# Patient Record
Sex: FEMALE | Race: WHITE
[De-identification: names, ages, dates, MRNs, and addresses within clinical notes are randomized per-mention and may not be internally consistent; named-entity substitution may affect disease eponyms.]

---

## 2017-02-22 ENCOUNTER — PRIOR ORIGINAL RECORDS (OUTPATIENT)
Dept: OTHER | Age: 82
End: 2017-02-22

## 2017-02-22 ENCOUNTER — MYAURORA ACCOUNT LINK (OUTPATIENT)
Dept: OTHER | Age: 82
End: 2017-02-22

## 2017-03-06 ENCOUNTER — PRIOR ORIGINAL RECORDS (OUTPATIENT)
Dept: OTHER | Age: 82
End: 2017-03-06

## 2017-03-23 ENCOUNTER — PRIOR ORIGINAL RECORDS (OUTPATIENT)
Dept: OTHER | Age: 82
End: 2017-03-23

## 2017-03-27 ENCOUNTER — PRIOR ORIGINAL RECORDS (OUTPATIENT)
Dept: OTHER | Age: 82
End: 2017-03-27

## 2017-03-27 ENCOUNTER — MYAURORA ACCOUNT LINK (OUTPATIENT)
Dept: OTHER | Age: 82
End: 2017-03-27

## 2017-05-26 ENCOUNTER — PRIOR ORIGINAL RECORDS (OUTPATIENT)
Dept: OTHER | Age: 82
End: 2017-05-26

## 2017-05-26 LAB
ALT (SGPT): 24 U/L
AST (SGOT): 21 U/L
CHOLESTEROL, TOTAL: 167 MG/DL
HDL CHOLESTEROL: 43 MG/DL
LDL CHOLESTEROL: 98 MG/DL
NON-HDL CHOLESTEROL: 124 MG/DL
TOTAL CHOLESTEROL / HDL RATIO: 3.9 RATIO UN
TRIGLYCERIDES: 129 MG/DL

## 2017-08-04 ENCOUNTER — PRIOR ORIGINAL RECORDS (OUTPATIENT)
Dept: OTHER | Age: 82
End: 2017-08-04

## 2018-01-21 ENCOUNTER — PRIOR ORIGINAL RECORDS (OUTPATIENT)
Dept: OTHER | Age: 83
End: 2018-01-21

## 2018-01-22 LAB
ALBUMIN: 4.2 G/DL
ALKALINE PHOSPHATATE(ALK PHOS): 75 IU/L
ALT (SGPT): 24 U/L
AST (SGOT): 18 U/L
BILIRUBIN TOTAL: 0.4 MG/DL
CHOLESTEROL, TOTAL: 169 MG/DL
HDL CHOLESTEROL: 52 MG/DL
LDL CHOLESTEROL: 95 MG/DL
NON-HDL CHOLESTEROL: 117 MG/DL
PROTEIN, TOTAL: 7.4 G/DL
TRIGLYCERIDES: 108 MG/DL

## 2018-01-30 ENCOUNTER — MYAURORA ACCOUNT LINK (OUTPATIENT)
Dept: OTHER | Age: 83
End: 2018-01-30

## 2018-01-30 ENCOUNTER — PRIOR ORIGINAL RECORDS (OUTPATIENT)
Dept: OTHER | Age: 83
End: 2018-01-30

## 2018-02-20 ENCOUNTER — PRIOR ORIGINAL RECORDS (OUTPATIENT)
Dept: OTHER | Age: 83
End: 2018-02-20

## 2018-02-20 LAB
INR: 1.8
PROTHROMBIN TIME: 21.3 SECONDS

## 2018-03-27 ENCOUNTER — PRIOR ORIGINAL RECORDS (OUTPATIENT)
Dept: OTHER | Age: 83
End: 2018-03-27

## 2018-05-30 ENCOUNTER — PRIOR ORIGINAL RECORDS (OUTPATIENT)
Dept: OTHER | Age: 83
End: 2018-05-30

## 2018-08-14 ENCOUNTER — PRIOR ORIGINAL RECORDS (OUTPATIENT)
Dept: OTHER | Age: 83
End: 2018-08-14

## 2018-08-22 LAB
HEMATOCRIT: 35.3 %
HEMOGLOBIN: 11.7 G/DL
PLATELETS: 357 K/UL
POLYS (ABSOLUTE): NORMAL X 10-3/U
RED BLOOD COUNT: 3.1 X 10-6/U
WHITE BLOOD COUNT: 7.3 X 10-3/U

## 2019-01-01 ENCOUNTER — EXTERNAL RECORD (OUTPATIENT)
Dept: HEALTH INFORMATION MANAGEMENT | Facility: OTHER | Age: 84
End: 2019-01-01

## 2019-02-28 VITALS
RESPIRATION RATE: 16 BRPM | SYSTOLIC BLOOD PRESSURE: 154 MMHG | HEIGHT: 63 IN | DIASTOLIC BLOOD PRESSURE: 92 MMHG | WEIGHT: 145.1 LBS | BODY MASS INDEX: 25.71 KG/M2 | HEART RATE: 70 BPM

## 2019-03-01 VITALS — HEART RATE: 70 BPM | DIASTOLIC BLOOD PRESSURE: 70 MMHG | SYSTOLIC BLOOD PRESSURE: 140 MMHG | WEIGHT: 149 LBS

## 2019-03-01 VITALS — HEIGHT: 63 IN | SYSTOLIC BLOOD PRESSURE: 128 MMHG | DIASTOLIC BLOOD PRESSURE: 60 MMHG

## 2019-04-18 RX ORDER — WARFARIN SODIUM 5 MG/1
TABLET ORAL
Qty: 45 TABLET | Refills: 0 | Status: SHIPPED | OUTPATIENT
Start: 2019-04-18

## 2019-04-19 ENCOUNTER — TELEPHONE (OUTPATIENT)
Dept: CARDIOLOGY | Age: 84
End: 2019-04-19

## 2019-07-26 LAB
ABSOLUTE IMMATURE GRANULOCYTES (OFFPRE24): NORMAL
ALBUMIN SERPL-MCNC: 4.5 G/DL
ALBUMIN/GLOB SERPL: NORMAL {RATIO}
ALP SERPL-CCNC: 93 U/L
ALT SERPL-CCNC: 19 U/L
ANION GAP SERPL CALC-SCNC: NORMAL MMOL/L
AST SERPL-CCNC: 17 U/L
BASO+EOS+MONOS # BLD: NORMAL 10*3/UL
BASO+EOS+MONOS NFR BLD: NORMAL %
BASOPHILS # BLD: NORMAL 10*3/UL
BASOPHILS NFR BLD: NORMAL %
BILIRUB SERPL-MCNC: 0.3 MG/DL
BUN SERPL-MCNC: NORMAL MG/DL
BUN/CREAT SERPL: NORMAL
CALCIUM SERPL-MCNC: 9.1 MG/DL
CHLORIDE SERPL-SCNC: 103 MMOL/L
CO2 SERPL-SCNC: NORMAL MMOL/L
CREAT SERPL-MCNC: NORMAL MG/DL
DIFFERENTIAL METHOD BLD: NORMAL
EOSINOPHIL # BLD: NORMAL 10*3/UL
EOSINOPHIL NFR BLD: NORMAL %
ERYTHROCYTE [DISTWIDTH] IN BLOOD: NORMAL %
GLOBULIN SER-MCNC: NORMAL G/DL
GLUCOSE SERPL-MCNC: 133 MG/DL
HCT VFR BLD CALC: 38.3 %
HGB BLD-MCNC: 12.6 G/DL
IMMATURE GRANULOCYTES (OFFPRE25): NORMAL
LENGTH OF FAST TIME PATIENT: NORMAL H
LYMPHOCYTES # BLD: NORMAL 10*3/UL
LYMPHOCYTES NFR BLD: NORMAL %
MCH RBC QN AUTO: 38 PG
MCHC RBC AUTO-ENTMCNC: 33 G/DL
MCV RBC AUTO: 115 FL
MONOCYTES # BLD: NORMAL 10*3/UL
MONOCYTES NFR BLD: NORMAL %
MPV (OFFPRE2): NORMAL
NEUTROPHILS # BLD: NORMAL 10*3/UL
NEUTROPHILS NFR BLD: NORMAL %
NRBC BLD MANUAL-RTO: NORMAL %
PLAT MORPH BLD: NORMAL
PLATELET # BLD: 361 10*3/UL
POTASSIUM SERPL-SCNC: 4.8 MMOL/L
PROT SERPL-MCNC: 7.3 G/DL
RBC # BLD: 3.3 10*6/UL
RBC MORPH BLD: NORMAL
SODIUM SERPL-SCNC: 141 MMOL/L
WBC # BLD: 8.6 10*3/UL
WBC MORPH BLD: NORMAL

## 2019-08-16 ENCOUNTER — LAB SERVICES (OUTPATIENT)
Dept: CARDIOLOGY | Age: 84
End: 2019-08-16

## 2019-09-11 ENCOUNTER — DOCUMENTATION (OUTPATIENT)
Dept: CARDIOLOGY | Age: 84
End: 2019-09-11

## 2019-09-24 ENCOUNTER — TELEPHONE (OUTPATIENT)
Dept: CARDIOLOGY | Age: 84
End: 2019-09-24

## 2019-09-25 RX ORDER — LISINOPRIL 20 MG/1
20 TABLET ORAL 2 TIMES DAILY
Qty: 60 TABLET | Refills: 0 | Status: SHIPPED | OUTPATIENT
Start: 2019-09-25

## 2020-01-01 ENCOUNTER — EXTERNAL RECORD (OUTPATIENT)
Dept: HEALTH INFORMATION MANAGEMENT | Facility: OTHER | Age: 85
End: 2020-01-01

## 2021-01-01 ENCOUNTER — EXTERNAL RECORD (OUTPATIENT)
Dept: HEALTH INFORMATION MANAGEMENT | Facility: OTHER | Age: 86
End: 2021-01-01

## 2023-06-02 ENCOUNTER — LAB REQUISITION (OUTPATIENT)
Dept: LAB | Facility: HOSPITAL | Age: 88
End: 2023-06-02
Payer: MEDICARE

## 2023-06-02 DIAGNOSIS — Z01.89 ENCOUNTER FOR OTHER SPECIFIED SPECIAL EXAMINATIONS: ICD-10-CM

## 2023-06-02 PROCEDURE — 87070 CULTURE OTHR SPECIMN AEROBIC: CPT | Performed by: OPHTHALMOLOGY

## 2023-06-02 PROCEDURE — 87205 SMEAR GRAM STAIN: CPT | Performed by: OPHTHALMOLOGY

## 2025-01-07 PROBLEM — N32.89 BLADDER MASS: Status: ACTIVE | Noted: 2024-11-26

## 2025-01-07 RX ORDER — ATORVASTATIN CALCIUM 40 MG/1
40 TABLET, FILM COATED ORAL DAILY
COMMUNITY

## 2025-01-07 RX ORDER — CLOTRIMAZOLE AND BETAMETHASONE DIPROPIONATE 10; .64 MG/G; MG/G
CREAM TOPICAL AS NEEDED
Status: ON HOLD | COMMUNITY
Start: 2024-11-29 | End: 2025-01-16 | Stop reason: CLARIF

## 2025-01-07 RX ORDER — AMLODIPINE BESYLATE 10 MG/1
10 TABLET ORAL DAILY
COMMUNITY

## 2025-01-07 RX ORDER — METOPROLOL SUCCINATE 25 MG/1
25 TABLET, EXTENDED RELEASE ORAL NIGHTLY
COMMUNITY

## 2025-01-07 RX ORDER — LEVOTHYROXINE SODIUM 75 UG/1
75 TABLET ORAL
COMMUNITY

## 2025-01-07 RX ORDER — LORAZEPAM 0.5 MG/1
0.5 TABLET ORAL 3 TIMES DAILY PRN
COMMUNITY

## 2025-01-07 RX ORDER — AMIODARONE HYDROCHLORIDE 200 MG/1
200 TABLET ORAL DAILY
COMMUNITY

## 2025-01-16 ENCOUNTER — HOSPITAL ENCOUNTER (OUTPATIENT)
Facility: HOSPITAL | Age: OVER 89
Discharge: HOME | End: 2025-01-16
Attending: UROLOGY | Admitting: UROLOGY
Payer: MEDICARE

## 2025-01-16 ENCOUNTER — ANESTHESIA EVENT (OUTPATIENT)
Dept: SURGERY | Facility: HOSPITAL | Age: OVER 89
End: 2025-01-16
Payer: MEDICARE

## 2025-01-16 ENCOUNTER — APPOINTMENT (OUTPATIENT)
Dept: GENERAL RADIOLOGY | Facility: HOSPITAL | Age: OVER 89
End: 2025-01-16
Attending: UROLOGY
Payer: MEDICARE

## 2025-01-16 ENCOUNTER — ANESTHESIA (OUTPATIENT)
Dept: SURGERY | Facility: HOSPITAL | Age: OVER 89
End: 2025-01-16
Payer: MEDICARE

## 2025-01-16 VITALS
DIASTOLIC BLOOD PRESSURE: 64 MMHG | RESPIRATION RATE: 16 BRPM | HEIGHT: 63 IN | SYSTOLIC BLOOD PRESSURE: 145 MMHG | OXYGEN SATURATION: 98 % | BODY MASS INDEX: 25.16 KG/M2 | TEMPERATURE: 97 F | HEART RATE: 65 BPM | WEIGHT: 142 LBS

## 2025-01-16 LAB
INR BLD: 1.2 (ref 0.8–1.2)
PROTHROMBIN TIME: 15.3 SECONDS (ref 11.6–14.8)

## 2025-01-16 PROCEDURE — 0TBB8ZZ EXCISION OF BLADDER, VIA NATURAL OR ARTIFICIAL OPENING ENDOSCOPIC: ICD-10-PCS | Performed by: UROLOGY

## 2025-01-16 PROCEDURE — 85610 PROTHROMBIN TIME: CPT

## 2025-01-16 PROCEDURE — 88305 TISSUE EXAM BY PATHOLOGIST: CPT | Performed by: UROLOGY

## 2025-01-16 PROCEDURE — 88342 IMHCHEM/IMCYTCHM 1ST ANTB: CPT | Performed by: UROLOGY

## 2025-01-16 RX ORDER — LIDOCAINE HYDROCHLORIDE 20 MG/ML
JELLY TOPICAL AS NEEDED
Status: DISCONTINUED | OUTPATIENT
Start: 2025-01-16 | End: 2025-01-16 | Stop reason: HOSPADM

## 2025-01-16 RX ORDER — HYDROCODONE BITARTRATE AND ACETAMINOPHEN 5; 325 MG/1; MG/1
2 TABLET ORAL ONCE AS NEEDED
Status: COMPLETED | OUTPATIENT
Start: 2025-01-16 | End: 2025-01-16

## 2025-01-16 RX ORDER — ONDANSETRON 2 MG/ML
4 INJECTION INTRAMUSCULAR; INTRAVENOUS EVERY 6 HOURS PRN
Status: DISCONTINUED | OUTPATIENT
Start: 2025-01-16 | End: 2025-01-16

## 2025-01-16 RX ORDER — HYDROMORPHONE HYDROCHLORIDE 1 MG/ML
0.6 INJECTION, SOLUTION INTRAMUSCULAR; INTRAVENOUS; SUBCUTANEOUS EVERY 5 MIN PRN
Status: DISCONTINUED | OUTPATIENT
Start: 2025-01-16 | End: 2025-01-16

## 2025-01-16 RX ORDER — DEXAMETHASONE SODIUM PHOSPHATE 4 MG/ML
VIAL (ML) INJECTION AS NEEDED
Status: DISCONTINUED | OUTPATIENT
Start: 2025-01-16 | End: 2025-01-16 | Stop reason: SURG

## 2025-01-16 RX ORDER — HYDROCODONE BITARTRATE AND ACETAMINOPHEN 5; 325 MG/1; MG/1
1 TABLET ORAL ONCE AS NEEDED
Status: COMPLETED | OUTPATIENT
Start: 2025-01-16 | End: 2025-01-16

## 2025-01-16 RX ORDER — HYDROMORPHONE HYDROCHLORIDE 1 MG/ML
0.4 INJECTION, SOLUTION INTRAMUSCULAR; INTRAVENOUS; SUBCUTANEOUS EVERY 5 MIN PRN
Status: DISCONTINUED | OUTPATIENT
Start: 2025-01-16 | End: 2025-01-16

## 2025-01-16 RX ORDER — ACETAMINOPHEN 500 MG
1000 TABLET ORAL ONCE
Status: DISCONTINUED | OUTPATIENT
Start: 2025-01-16 | End: 2025-01-16 | Stop reason: HOSPADM

## 2025-01-16 RX ORDER — LABETALOL HYDROCHLORIDE 5 MG/ML
5 INJECTION, SOLUTION INTRAVENOUS EVERY 5 MIN PRN
Status: DISCONTINUED | OUTPATIENT
Start: 2025-01-16 | End: 2025-01-16

## 2025-01-16 RX ORDER — ONDANSETRON 2 MG/ML
INJECTION INTRAMUSCULAR; INTRAVENOUS AS NEEDED
Status: DISCONTINUED | OUTPATIENT
Start: 2025-01-16 | End: 2025-01-16 | Stop reason: SURG

## 2025-01-16 RX ORDER — SODIUM CHLORIDE, SODIUM LACTATE, POTASSIUM CHLORIDE, CALCIUM CHLORIDE 600; 310; 30; 20 MG/100ML; MG/100ML; MG/100ML; MG/100ML
INJECTION, SOLUTION INTRAVENOUS CONTINUOUS
Status: DISCONTINUED | OUTPATIENT
Start: 2025-01-16 | End: 2025-01-16

## 2025-01-16 RX ORDER — ACETAMINOPHEN 500 MG
1000 TABLET ORAL ONCE AS NEEDED
Status: COMPLETED | OUTPATIENT
Start: 2025-01-16 | End: 2025-01-16

## 2025-01-16 RX ORDER — HYDROXYUREA 500 MG/1
1000 CAPSULE ORAL
COMMUNITY

## 2025-01-16 RX ORDER — METOCLOPRAMIDE HYDROCHLORIDE 5 MG/ML
10 INJECTION INTRAMUSCULAR; INTRAVENOUS EVERY 8 HOURS PRN
Status: DISCONTINUED | OUTPATIENT
Start: 2025-01-16 | End: 2025-01-16

## 2025-01-16 RX ORDER — HYDROMORPHONE HYDROCHLORIDE 1 MG/ML
0.2 INJECTION, SOLUTION INTRAMUSCULAR; INTRAVENOUS; SUBCUTANEOUS EVERY 5 MIN PRN
Status: DISCONTINUED | OUTPATIENT
Start: 2025-01-16 | End: 2025-01-16

## 2025-01-16 RX ORDER — LIDOCAINE HYDROCHLORIDE 10 MG/ML
INJECTION, SOLUTION EPIDURAL; INFILTRATION; INTRACAUDAL; PERINEURAL AS NEEDED
Status: DISCONTINUED | OUTPATIENT
Start: 2025-01-16 | End: 2025-01-16 | Stop reason: SURG

## 2025-01-16 RX ORDER — NALOXONE HYDROCHLORIDE 0.4 MG/ML
80 INJECTION, SOLUTION INTRAMUSCULAR; INTRAVENOUS; SUBCUTANEOUS AS NEEDED
Status: DISCONTINUED | OUTPATIENT
Start: 2025-01-16 | End: 2025-01-16

## 2025-01-16 RX ORDER — ACETAMINOPHEN 325 MG/1
TABLET ORAL EVERY 6 HOURS PRN
Qty: 30 TABLET | Refills: 0 | Status: SHIPPED | COMMUNITY
Start: 2025-01-16 | End: 2025-01-21

## 2025-01-16 RX ORDER — HYDROXYUREA 500 MG/1
500 CAPSULE ORAL SEE ADMIN INSTRUCTIONS
COMMUNITY

## 2025-01-16 RX ADMIN — LIDOCAINE HYDROCHLORIDE 50 MG: 10 INJECTION, SOLUTION EPIDURAL; INFILTRATION; INTRACAUDAL; PERINEURAL at 12:22:00

## 2025-01-16 RX ADMIN — DEXAMETHASONE SODIUM PHOSPHATE 4 MG: 4 MG/ML VIAL (ML) INJECTION at 12:30:00

## 2025-01-16 RX ADMIN — ONDANSETRON 4 MG: 2 INJECTION INTRAMUSCULAR; INTRAVENOUS at 12:50:00

## 2025-01-16 RX ADMIN — SODIUM CHLORIDE, SODIUM LACTATE, POTASSIUM CHLORIDE, CALCIUM CHLORIDE: 600; 310; 30; 20 INJECTION, SOLUTION INTRAVENOUS at 12:15:00

## 2025-01-16 NOTE — H&P
History and Physical     Latasha Murillo Patient Status:  Outpatient in a Bed    1934 MRN [id number]   Location [facility] PERIOPERATIVE SERVICE Attending Bryan Valadez MD   Hosp Day # 0 PCP Yazan Kitchen DO     Chief Complaint: bladder mass    Subjective:    Latasha Murillo is a 89 year old female with abnormal office cystoscopy showing an inflammatory mass in the bladder.  Has a pelvic pressure.   Does have cystocele, managed with pessary with GYN, just recently cleaned  Generally has reasonable bladder control  No current urinary tract infection or active hematuria  Office urine cytology was negative for malignancy  Presents for OR cystoscopy with TURBT    History/Other:      Past Medical History:  Past Medical History:    Anxiety state    Arrhythmia    A-FIB, HX OF ABLATION, HAS PACEMAKER    Cystocele, midline    managed with pessary    Disorder of thyroid    Endocrine disorder    Glaucoma    High blood pressure    High cholesterol    Neuropathy    FEET    Osteoarthritis    Visual impairment    READING GLASSES        Past Surgical History:   Past Surgical History:   Procedure Laterality Date    Cardiac pacemaker placement      Cystourethroscopy  2024    bladder mass noted in the office    Fracture surgery      R LEG    Ndsc ablation & rcnstj atria lmtd w/o bypass      Other surgical history      TONSILS AND WISDOM TEETH       Social History:  reports that she has never smoked. She has never used smokeless tobacco. She reports that she does not drink alcohol and does not use drugs.    Family History: History reviewed. No pertinent family history.    Allergies: Allergies[1]    Medications:  Medications Ordered Prior to Encounter[2]    Review of Systems:   A comprehensive 14 point review of systems was completed.    Pertinent positives and negatives noted in the HPI.    Objective:     BP (!) 169/67 (BP Location: Right arm)   Pulse 61   Temp 97.3 °F (36.3 °C) (Temporal)   Resp 16   Ht 5' 3\"  (1.6 m)   Wt 142 lb (64.4 kg)   LMP  (LMP Unknown)   SpO2 99%   BMI 25.15 kg/m²   General: No acute distress.  Alert ,         Respiratory: Clear   Cardiovascular: has pacemaker   Chest and Back: No tenderness or deformity.  Abdomen: Soft, nontender, nondistended.    Neurologic: No focal neurological deficits.   Musculoskeletal: Moves all extremities.  Extremities: No edema or cyanosis.  Psychiatric: Appropriate mood and affect.  Integument: No rashes or lesions.     Results:    Labs:  Cytology 11/26  Negative for HG urothelial carcinoma  urine culture no growth    Imaging: Imaging data reviewed in Epic.  [facility] CT  IMPRESSION:    1.  Punctate 1 mm nonobstructing right renal stone.   2.  Diffuse bladder wall thickening may relate to cystitis. Correlate clinically. Superimposed   left-sided asymmetric thickening also noted, and correlation with cystoscopy should be considered to exclude an infiltrative malignant process.   3.  Hyperattenuation of the liver, which can be seen in the setting of amiodarone hepatotoxicity   among other etiologies such as iron deposition. Correlate clinically.   4.  Cystic lesions of the pancreas warrant further evaluation by MRI.   5.  Right ovarian 3 cm cyst warrants further evaluation by pelvic ultrasound.     Assessment & Plan:    #bladder mass, left side, 2-3cm size  #pelvic pain, lateralizing to left      Plan of care discussed with patient, 3 children  Surgical risks, benefits and alternatives discussed.  Risks of TURBT including but not limited to bleeding, infection, bladder injury or perforation, possible need for post-operative catheter, possible diagnosis of cancer requiring further therapy and follow-up, and recurrence with need to repeat procedure reviewed.  Patient will schedule TURBT as patient. She has a pacemaker. She has her warfarin on hold. We are waiting for INR. She had been  Instructed to stop blood thinner. She is amenable to same day dc or overnight monitoring  as needed. Reviewed possible care of damico catheter.  Her pessary was recently cleaned by ABDIAS Valadez MD  1/16/2025    Supplementary Documentation:                                      [1] No Known Allergies  [2]   No current facility-administered medications on file prior to encounter.     Current Outpatient Medications on File Prior to Encounter   Medication Sig Dispense Refill    hydroxyurea 500 MG Oral Cap Take 1 capsule (500 mg total) by mouth See Admin Instructions. 500mg 1 cap oral Monday thru Friday.      hydroxyurea 500 MG Oral Cap Take 2 capsules (1,000 mg total) by mouth twice a week. 500mg 2 caps 2x/week every Saturday and Sunday.      amiodarone 200 MG Oral Tab Take 1 tablet (200 mg total) by mouth daily.      amLODIPine 10 MG Oral Tab Take 1 tablet (10 mg total) by mouth daily.      atorvastatin 40 MG Oral Tab Take 1 tablet (40 mg total) by mouth daily.      levothyroxine 75 MCG Oral Tab Take 1 tablet (75 mcg total) by mouth every morning before breakfast.      LORazepam 0.5 MG Oral Tab Take 1 tablet (0.5 mg total) by mouth 3 (three) times daily as needed for Anxiety.      metoprolol succinate ER 25 MG Oral Tablet 24 Hr Take 1 tablet (25 mg total) by mouth nightly.      warfarin 5 MG Oral Tab Take 0.5 tablets (2.5 mg total) by mouth nightly. TAKE AS DIRECTED PER COUMADIN CLINIC

## 2025-01-16 NOTE — DISCHARGE INSTRUCTIONS
You can cancel the nursing visit for Monday as no damico catheter was placed at the end of surgery    You had a procedure called a CYSTOSCOPY with bladder mass removal (TURBT)    - You may experience pain after the procedure for 1-2 days. If pain becomes intolerable please contact our office or go to the nearest Emergency Room/Immediate Care.     - You may experience burning with urination and frequency of urination over the next few days.    - You may see blood in your urine that will clear up within 1-2 days.    - Abstain from alcohol, coffee, tea, artificial sweeteners, and spicy food for the next 48 hours.    - If you develop a fever, chills, difficulty urinating or abdominal pain in the next 24hours, call the office.    - Drink 1.5 to 2 liters of fluid today, water is preferable.        It is recommended that you follow up with Dr. Valadez in the next couple of weeks to review your pathology results..   You can schedule your next visit on the way out or call us at your convenience. Our phone number is [phone].    Thank you for the pleasure of allowing us to be involved in your care.     MD [facility]

## 2025-01-16 NOTE — ANESTHESIA POSTPROCEDURE EVALUATION
[facility]    Latasha Murillo Patient Status:  Outpatient in a Bed   Age/Gender 89 year old female MRN [id number]   Location [facility] POST ANESTHESIA CARE UNIT Attending Bryan Valadez MD   Hosp Day # 0 PCP Yazan Kitchen DO       Anesthesia Post-op Note    CYSTOSCOPY, TRANSURETHRAL RESECTION OF BLADDER TUMOR,    Procedure Summary       Date: 01/16/25 Room / Location:  MAIN OR 07 /  MAIN OR    Anesthesia Start: 1215 Anesthesia Stop: 1316    Procedure: CYSTOSCOPY, TRANSURETHRAL RESECTION OF BLADDER TUMOR, (Bilateral: Urethra) Diagnosis: (bladder mass n32.89)    Surgeons: Bryan Valadez MD Anesthesiologist: Jorje White MD    Anesthesia Type: general ASA Status: 3            Anesthesia Type: general    Vitals Value Taken Time   /65 01/16/25 1317   Temp 97.1 °F (36.2 °C) 01/16/25 1317   Pulse 60 01/16/25 1317   Resp 10 01/16/25 1317   SpO2 100 % 01/16/25 1317   Vitals shown include unfiled device data.        Patient Location: PACU    Anesthesia Type: general    Airway Patency: patent and extubated    Postop Pain Control: adequate    Mental Status: mildly sedated but able to meaningfully participate in the post-anesthesia evaluation    Nausea/Vomiting: none    Cardiopulmonary/Hydration status: stable euvolemic    Complications: no apparent anesthesia related complications    Postop vital signs: stable    Dental Exam: Unchanged from Preop    Patient to be discharged from PACU when criteria met.

## 2025-01-16 NOTE — OPERATIVE REPORT
[facility]   part of [facility]    Operative Note         Latasha Murillo Location: OR   [facility] [id number] MRN [id number]   Admission Date 1/16/2025 Operation Date 1/16/2025   Attending Physician Bryan Valadez MD       Patient Name: Latasha Murillo     Preoperative Diagnosis: bladder mass n32.89     Postoperative Diagnosis: bladder mass n32.89     Procedure(s):  CYSTOSCOPY, TRANSURETHRAL RESECTION OF BLADDER TUMOR,     Primary Surgeon: Bryan Valadez MD           Anesthesia: General     Specimen:   ID Type Source Tests Collected by Time Destination   1 : Bladder Mass- Left Lateral wall Tissue Bladder SURGICAL PATHOLOGY TISSUE Bryan Valadez MD 1/16/2025 1239         Estimated Blood Loss: Blood Output: 5 mL (1/16/2025 12:50 PM)       Complications: none      Indications for procedure: abnormal bladder on CT. Abnormal cystoscopy on office diagnostic evaluation. Here today for formal cystoscopy and resection for pathology. Cytology was negative for high grade malignancy.  Accompanied by her children    Surgical Findings: has pessary in the vagina to support prolapse.  Significant trabeculation of the bladder 3-4+ with multiple scattered cellules. No large dominant diverticuli.  More edematous changes to the bands of trabeculation in a patch on the left lateral wall as previously noted in the office. No papillary mass. No mass or stone in the cellules.  Resected and fulgurated.     Complexity: NA (optional)    Operative Summary:         INDICATION FOR PROCEDURE: Latasha is a 89 year old female who on cystoscopy for abnormal CT findings was found to have inflammatory mass change in the lateral wall of the bladder on the left.  We discussed the possibility of bladder cancer and the need for biopsy and formal removal. The patient understands the risks of the procedure, including but not limited to a general anesthetic, bleeding, infection, bladder injury, urethral injury, bladder perforation, the possible need for Price  catheter, postoperative dysuria, urgency, frequency, incontinence, and retention. The patient understands the risk of recurrence.  The patient would like to proceed.      PROCEDURE IN DETAIL: The patient was brought to the operating room after being correctly identified and surgical consent was obtained. All questions were answered. General anesthesia was induced without difficulty. The legs were placed in the dorsal lithotomy position, padded and secured, and the area were prepped and draped in the usual sterile fashion. A 22-Macanese cystoscope was used to inspect the bladder and urethra. Urine was grossly clear, non bloody.  Pan endoscopy shows clear UOs, severe trabeculation, no stones. There are scattered cellules, no large dominant diverticuli.  There is more edematous change to the left lateral wall around a patch of trabeculation. No papillary changes.  With irrigation, there is some weeping.  This region was cup biopsied to remove all mass change.  Diameter Size of this edematous change is 2cm.  No bladder perforation with this resection.  Controlled with fulguration/bugbee with good hemostasis.  Tissue sent for pathology.  Lidocaine jelly inserted per urethra.  Vaginal pessary did not need to be manipulated.  Vaginal vault had been irrigated and the region is clean.    At the end of the procedure, the legs were brought down to the supine position, and the patient was extubated, and transported to the recovery room in stable condition. I was present and performed the entire procedure as stated above. There were no complications.  No damico was placed. She will be discharged home with family.            Implants: * No implants in log *     Drains: none     Condition: stable       Bryan Valadez MD

## 2025-01-16 NOTE — ANESTHESIA PROCEDURE NOTES
Airway  Date/Time: 1/16/2025 12:23 PM  Urgency: elective    Airway not difficult    General Information and Staff    Patient location during procedure: OR  Anesthesiologist: Jorje White MD  Performed: anesthesiologist   Performed by: Jorje White MD  Authorized by: Jorje White MD      Indications and Patient Condition  Indications for airway management: anesthesia  Sedation level: deep  Preoxygenated: yes  Patient position: sniffing  Mask difficulty assessment: 1 - vent by mask    Final Airway Details  Final airway type: supraglottic airway      Successful airway: classic  Size 4       Number of attempts at approach: 1  Number of other approaches attempted: 0

## 2025-01-16 NOTE — ANESTHESIA PREPROCEDURE EVALUATION
PRE-OP EVALUATION    Patient Name: Latasha Murillo    Admit Diagnosis: bladder mass n32.89    Pre-op Diagnosis: bladder mass n32.89    CYSTOSCOPY, TRANSURETHRAL RESECTION OF BLADDER TUMOR, POSSIBLE BILATERAL RETROGRADE PYELOGRAM    Anesthesia Procedure: CYSTOSCOPY, TRANSURETHRAL RESECTION OF BLADDER TUMOR, POSSIBLE BILATERAL RETROGRADE PYELOGRAM (Bilateral)    Surgeons and Role:     * Bryan Valadez MD - Primary    Pre-op vitals reviewed.  Temp: 97.3 °F (36.3 °C)  Pulse: 61  Resp: 16  BP: 169/67  SpO2: 99 %  Body mass index is 25.15 kg/m².    Current medications reviewed.  Hospital Medications:   [Transfer Hold] acetaminophen (Tylenol Extra Strength) tab 1,000 mg  1,000 mg Oral Once    lactated ringers infusion   Intravenous Continuous    [COMPLETED] ceFAZolin (Ancef) 2g in 10mL IV syringe premix  2 g Intravenous Once    ceFAZolin (Ancef) 2 g/10mL IV syringe premix           Outpatient Medications:   Prescriptions Prior to Admission[1]    Allergies: Patient has no known allergies.      Anesthesia Evaluation    Patient summary reviewed.    Anesthetic Complications  (-) history of anesthetic complications         GI/Hepatic/Renal    Negative GI/hepatic/renal ROS.                             Cardiovascular        Exercise tolerance: good     MET: >4      (+) hypertension   (+) hyperlipidemia        (+) pacemaker/AICD                          Endo/Other    Negative endo/other ROS.                              Pulmonary    Negative pulmonary ROS.                       Neuro/Psych    Negative neuro/psych ROS.                                  Past Surgical History:   Procedure Laterality Date    Cardiac pacemaker placement      Cystourethroscopy  12/2024    bladder mass noted in the office    Fracture surgery      R LEG    Ndsc ablation & rcnstj atria lmtd w/o bypass      Other surgical history      TONSILS AND WISDOM TEETH     Social History     Socioeconomic History    Marital status:    Tobacco Use    Smoking  status: Never    Smokeless tobacco: Never   Vaping Use    Vaping status: Never Used   Substance and Sexual Activity    Alcohol use: No    Drug use: No     History   Drug Use No     Available pre-op labs reviewed.        Lab Results   Component Value Date    INR 1.20 01/16/2025         Airway      Mallampati: II  Mouth opening: >3 FB  TM distance: > 6 cm  Neck ROM: full Cardiovascular    Cardiovascular exam normal.  Rhythm: regular  Rate: normal     Dental    Dentition appears grossly intact         Pulmonary    Pulmonary exam normal.  Breath sounds clear to auscultation bilaterally.               Other findings              ASA: 3   Plan: general  NPO status verified and patient meets guidelines.  Patient has not taken beta blockers in last 24 hours.  Post-procedure pain management plan discussed with surgeon and patient.    Comment: I spoke with the patient and discussed the risks of general anesthesia, which include nausea and vomiting; sore throat; injury to the lips, gums, teeth, and eyes; cardiac, pulmonary, and neurologic events; aspiration; and allergic reactions. The patient understands these risks and consents to receiving general anesthesia for this procedure.  Plan/risks discussed with: child/children and patient  Use of blood product(s) discussed with: patient    Consented to blood products.          Present on Admission:  **None**             [1]   Medications Prior to Admission   Medication Sig Dispense Refill Last Dose/Taking    hydroxyurea 500 MG Oral Cap Take 1 capsule (500 mg total) by mouth See Admin Instructions. 500mg 1 cap oral Monday thru Friday.   1/15/2025 at  8:00 AM    hydroxyurea 500 MG Oral Cap Take 2 capsules (1,000 mg total) by mouth twice a week. 500mg 2 caps 2x/week every Saturday and Sunday.   1/12/2025    amiodarone 200 MG Oral Tab Take 1 tablet (200 mg total) by mouth daily.   1/16/2025 at  7:00 AM    amLODIPine 10 MG Oral Tab Take 1 tablet (10 mg total) by mouth daily.    1/16/2025 at  7:00 AM    atorvastatin 40 MG Oral Tab Take 1 tablet (40 mg total) by mouth daily.   1/15/2025 at  7:00 PM    levothyroxine 75 MCG Oral Tab Take 1 tablet (75 mcg total) by mouth every morning before breakfast.   1/15/2025 at  8:00 AM    LORazepam 0.5 MG Oral Tab Take 1 tablet (0.5 mg total) by mouth 3 (three) times daily as needed for Anxiety.   Past Month    metoprolol succinate ER 25 MG Oral Tablet 24 Hr Take 1 tablet (25 mg total) by mouth nightly.   1/15/2025 at 10:00 PM    warfarin 5 MG Oral Tab Take 0.5 tablets (2.5 mg total) by mouth nightly. TAKE AS DIRECTED PER COUMADIN CLINIC   1/10/2025

## (undated) DEVICE — SYRINGE,TOOMEY,IRRIGATION,70CC,STERILE: Brand: MEDLINE

## (undated) DEVICE — TUR/ENDOSCOPIC CABLE, 10' (3.05 M): Brand: CONMED

## (undated) DEVICE — Device: Brand: OLYMPUS

## (undated) DEVICE — SYRINGE MED 30ML STD CLR PLAS LL TIP N CTRL

## (undated) DEVICE — BAG DRNGE 2000ML URIN INF CTRL ANTIREFLX

## (undated) DEVICE — SOLUTION IRRIG 1000ML ST H2O AQUALITE PLAS

## (undated) DEVICE — SLEEVE COMPR MD KNEE LEN SGL USE KENDALL SCD

## (undated) DEVICE — GLOVE,SURG,SENSICARE SLT,LF,PF,6.5: Brand: MEDLINE

## (undated) DEVICE — CATH FOLEY 22FR 30CC 2-W INF CTRL STD

## (undated) DEVICE — Device

## (undated) DEVICE — CHEMOTHERAPY CONTAINER,SLIDE LID, YELLOW: Brand: SHARPSAFETY

## (undated) DEVICE — PACK PBDS CYSTOSCOPY

## (undated) DEVICE — SOLUTION IRRIG 3000ML 0.9% NACL FLX CONT

## (undated) NOTE — LETTER
OUTSIDE TESTING RESULT REQUEST     IMPORTANT: FOR YOUR IMMEDIATE ATTENTION  Please FAX all test results listed below to: [phone]     Testing already done on or about: 24     * * * * If testing is NOT complete, arrange with patient A.S.A.P. * * * *    Patient Name: Latasha Murillo  Surgery Date: 2025  Medical Record: [id number]  CSN: [id number]  : 1934 - A: 89 y     Sex: female  Surgeon(s):  Bryan Valadez MD  Procedure: TRANSURETHRAL RESECTION OF BLADDER TUMOR, POSSIBLE BILATERAL RETROGRADE PYELOGRAM  Anesthesia Type: General     Surgeon: Bryan Valadez MD     The following Testing and Time Line are REQUIRED PER ANESTHESIA     EKG READ AND SIGNED WITHIN   90 days  BMP (requires 4 hour fast) within  90 days      Thank You,   Sent by: LAUREN Phillips

## (undated) NOTE — LETTER
[employer] Cardiac Device Communication Tool    Preop to complete    Patient Name Latasha Murillo   Patient  - AGE - SEX 1934 - A: 89 y - female   Surgical Date 2025   Surgical Procedure CYSTOSCOPY, TRANSURETHRAL RESECTION OF BLADDER TUMOR, POSSIBLE BILATERAL RETROGRADE PYELOGRAM   Surgical Location [facility]   Type of cautery anticipated: Monopolar   Surgical procedure > 2 hours      Device Clinic to Complete the Information Below, Sign and Fax to [phone]    Pacemaker or ICD    Atrial or atrial-ventricular lead?        Indication for device    Is patient pacemaker dependent?    Has pt had routine f/u and is battery life > 3 months    Is ICD programmed to inhibit therapy w/magnet?    Does device have rate response or other sensor?      Surgical Procedure above [facility] Surgical Procedure @ [facility] and below   < 6 in. from ICD: Reprogram therapies OFF w/  asynchronous pacing if PM dependent  < 6 in. from PM: Reprogram asynchronous if PM   dependent ICD: No Change  PM: No Change   > 6 in. from ICD: Magnet*  > 6 in. from PM:  No Change*  * If PM dependent observe for pacing inhibition and minimize cautery if inhibition is seen                                              Bipolar cautery: No Change   Cardiac Device Management Plan (check one)            ___  Reprogram (PAT Dept to provide Rep w/ arrival date/time)   ___ Magnet    ___ No Change    Comments: ___________________________________________________________________        Signature: ________________________________ Date: ____________ Time: ______________     Print Name: ___________________________________

## (undated) NOTE — LETTER
OUTSIDE TESTING RESULT REQUEST     IMPORTANT: FOR YOUR IMMEDIATE ATTENTION  Please FAX all test results listed below to: [phone]     Testing already done on or about: 24     * * * * If testing is NOT complete, arrange with patient A.S.A.P. * * * *      Patient Name: Latasha Murillo  Surgery Date: 2025  Medical Record: [id number]  CSN: [id number]  : 1934 - A: 89 y     Sex: female  Surgeon(s):  Bryan Valadez MD  Procedure: CYSTOSCOPY, TRANSURETHRAL RESECTION OF BLADDER TUMOR, POSSIBLE BILATERAL RETROGRADE PYELOGRAM  Anesthesia Type: General     Surgeon: Bryan Valadez MD     The following Testing and Time Line are REQUIRED PER ANESTHESIA     EKG READ AND SIGNED WITHIN   90 days      Thank You,   Sent by: LAUREN Phillips